# Patient Record
Sex: FEMALE | Race: WHITE | NOT HISPANIC OR LATINO | Employment: OTHER | URBAN - METROPOLITAN AREA
[De-identification: names, ages, dates, MRNs, and addresses within clinical notes are randomized per-mention and may not be internally consistent; named-entity substitution may affect disease eponyms.]

---

## 2021-07-13 ENCOUNTER — TELEPHONE (OUTPATIENT)
Dept: OBSTETRICS AND GYNECOLOGY | Facility: CLINIC | Age: 81
End: 2021-07-13

## 2021-07-13 RX ORDER — FLUCONAZOLE 150 MG/1
TABLET ORAL
Qty: 2 TABLET | Refills: 1 | Status: CANCELLED | OUTPATIENT
Start: 2021-07-13

## 2022-04-19 ENCOUNTER — OFFICE VISIT (OUTPATIENT)
Dept: OBSTETRICS AND GYNECOLOGY | Facility: CLINIC | Age: 82
End: 2022-04-19
Payer: MEDICARE

## 2022-04-19 VITALS — WEIGHT: 129 LBS | DIASTOLIC BLOOD PRESSURE: 58 MMHG | SYSTOLIC BLOOD PRESSURE: 90 MMHG

## 2022-04-19 DIAGNOSIS — N36.8 MASS OF URETHRA: Primary | ICD-10-CM

## 2022-04-19 DIAGNOSIS — R30.0 DYSURIA: ICD-10-CM

## 2022-04-19 PROCEDURE — 87088 URINE BACTERIA CULTURE: CPT

## 2022-04-19 PROCEDURE — 99204 PR OFFICE/OUTPT VISIT, NEW, LEVL IV, 45-59 MIN: ICD-10-PCS | Mod: S$GLB,,,

## 2022-04-19 PROCEDURE — 87077 CULTURE AEROBIC IDENTIFY: CPT

## 2022-04-19 PROCEDURE — 87186 SC STD MICRODIL/AGAR DIL: CPT | Mod: 59

## 2022-04-19 PROCEDURE — 87086 URINE CULTURE/COLONY COUNT: CPT

## 2022-04-19 PROCEDURE — 99204 OFFICE O/P NEW MOD 45 MIN: CPT | Mod: S$GLB,,,

## 2022-04-19 RX ORDER — ONDANSETRON 4 MG/1
4 TABLET, ORALLY DISINTEGRATING ORAL EVERY 8 HOURS PRN
COMMUNITY
Start: 2021-05-12 | End: 2022-11-30

## 2022-04-19 RX ORDER — AMLODIPINE BESYLATE 2.5 MG/1
TABLET ORAL
COMMUNITY
Start: 2021-05-05

## 2022-04-19 RX ORDER — DICLOFENAC SODIUM 10 MG/G
1 GEL TOPICAL
COMMUNITY
Start: 2021-11-11 | End: 2022-11-30

## 2022-04-19 RX ORDER — EVOLOCUMAB 140 MG/ML
INJECTION, SOLUTION SUBCUTANEOUS
COMMUNITY
Start: 2022-02-22

## 2022-04-19 RX ORDER — TRAZODONE HYDROCHLORIDE 100 MG/1
1 TABLET ORAL NIGHTLY
COMMUNITY
Start: 2021-08-24 | End: 2022-11-30

## 2022-04-19 RX ORDER — MIDODRINE HYDROCHLORIDE 2.5 MG/1
2.5 TABLET ORAL 3 TIMES DAILY
COMMUNITY
Start: 2022-01-31

## 2022-04-19 RX ORDER — LACTULOSE 10 G/15ML
SOLUTION ORAL; RECTAL
COMMUNITY
Start: 2021-12-14 | End: 2022-12-14

## 2022-04-19 RX ORDER — NITROFURANTOIN 25; 75 MG/1; MG/1
100 CAPSULE ORAL 2 TIMES DAILY
Qty: 14 CAPSULE | Refills: 0 | Status: SHIPPED | OUTPATIENT
Start: 2022-04-19 | End: 2022-04-26

## 2022-04-19 RX ORDER — CYANOCOBALAMIN 1000 UG/ML
1000 INJECTION, SOLUTION INTRAMUSCULAR; SUBCUTANEOUS
COMMUNITY
Start: 2021-11-30 | End: 2022-11-30

## 2022-04-19 RX ORDER — GABAPENTIN 300 MG/1
1 CAPSULE ORAL 3 TIMES DAILY
COMMUNITY
Start: 2022-03-29 | End: 2023-03-29

## 2022-04-19 RX ORDER — CEPHALEXIN 500 MG/1
CAPSULE ORAL
COMMUNITY
Start: 2021-08-25 | End: 2022-04-19

## 2022-04-19 RX ORDER — HYDROCORTISONE 25 MG/G
CREAM TOPICAL
COMMUNITY
Start: 2021-05-12

## 2022-04-19 RX ORDER — HYDRALAZINE HYDROCHLORIDE 10 MG/1
10 TABLET, FILM COATED ORAL
COMMUNITY
Start: 2021-11-30 | End: 2022-11-30

## 2022-04-19 RX ORDER — DICYCLOMINE HYDROCHLORIDE 20 MG/1
20 TABLET ORAL
COMMUNITY
Start: 2021-11-30 | End: 2022-11-30

## 2022-04-19 RX ORDER — SUCRALFATE 1 G/1
1 TABLET ORAL
COMMUNITY
Start: 2021-05-12 | End: 2023-04-11

## 2022-04-19 RX ORDER — EZETIMIBE 10 MG/1
10 TABLET ORAL DAILY
COMMUNITY
Start: 2021-12-14

## 2022-04-19 RX ORDER — CLOTRIMAZOLE AND BETAMETHASONE DIPROPIONATE 10; .64 MG/G; MG/G
CREAM TOPICAL
COMMUNITY
Start: 2021-07-14

## 2022-04-19 RX ORDER — TIZANIDINE 4 MG/1
1 TABLET ORAL EVERY 8 HOURS
COMMUNITY
Start: 2021-11-30 | End: 2022-11-30

## 2022-04-19 RX ORDER — HYDROCODONE BITARTRATE AND ACETAMINOPHEN 5; 325 MG/1; MG/1
1 TABLET ORAL 2 TIMES DAILY PRN
COMMUNITY
Start: 2022-03-22

## 2022-04-19 RX ORDER — PANTOPRAZOLE SODIUM 40 MG/1
1 TABLET, DELAYED RELEASE ORAL DAILY
COMMUNITY
Start: 2021-05-12 | End: 2023-01-04

## 2022-04-19 RX ORDER — ERGOCALCIFEROL 1.25 MG/1
50000 CAPSULE ORAL
COMMUNITY
Start: 2021-11-30 | End: 2022-11-30

## 2022-04-19 RX ORDER — CYCLOBENZAPRINE HCL 5 MG
5 TABLET ORAL 3 TIMES DAILY PRN
COMMUNITY
Start: 2022-04-08

## 2022-04-19 RX ORDER — ROSUVASTATIN CALCIUM 20 MG/1
1 TABLET, COATED ORAL DAILY
COMMUNITY
Start: 2021-11-30 | End: 2022-11-30

## 2022-04-19 RX ORDER — ERGOCALCIFEROL 1.25 MG/1
CAPSULE ORAL
COMMUNITY
Start: 2022-04-06

## 2022-04-19 RX ORDER — NALOXONE HYDROCHLORIDE 4 MG/.1ML
4 SPRAY NASAL DAILY PRN
COMMUNITY
Start: 2022-03-22 | End: 2023-03-22

## 2022-04-19 RX ORDER — LANOLIN ALCOHOL/MO/W.PET/CERES
1 CREAM (GRAM) TOPICAL DAILY
COMMUNITY
Start: 2021-11-30 | End: 2022-11-30

## 2022-04-19 RX ORDER — HYOSCYAMINE SULFATE 0.12 MG/1
0.12 TABLET SUBLINGUAL
COMMUNITY
Start: 2022-01-04 | End: 2023-01-04

## 2022-04-19 RX ORDER — METFORMIN HYDROCHLORIDE 500 MG/1
500 TABLET, EXTENDED RELEASE ORAL 2 TIMES DAILY
COMMUNITY
Start: 2021-12-14

## 2022-04-20 NOTE — PROGRESS NOTES
SUBJECTIVE:       Chief Complaint: Urinary Tract Infection (Patient states she has been bleeding for over a week. Patine states she has burning during urination. )    History of Present Illness: Davida Chery is a 81 y.o. female  presenting for abnormal bleeding, painful urination, and pain with sex.  She reports the spotting/bleeding and dysuria have persisted for the last week.  She states she had a complete hysterectomy at age 26 years.  She is unsure if the bleeding originates from the vagina.  She reports difficulty with penetration during sex.  She has one male partner (spouse).  She states she feels safe at home.  She uses a wheelchair for assistance in ambulation.    Review of Systems   Constitutional: Negative for activity change, appetite change, chills, fatigue, fever and unexpected weight change.   HENT: Negative for nasal congestion, dental problem, ear pain, postnasal drip, rhinorrhea, sinus pressure/congestion, sneezing and sore throat.    Eyes: Negative for discharge, visual disturbance and eye dryness.   Respiratory: Negative for cough, chest tightness and shortness of breath.    Cardiovascular: Negative for chest pain, palpitations and leg swelling.   Gastrointestinal: Negative for abdominal distention, abdominal pain, change in bowel habit, constipation, diarrhea, nausea, vomiting, reflux and change in bowel habit.   Endocrine: Negative for cold intolerance, heat intolerance, polydipsia and polyuria.   Genitourinary: Positive for difficulty urinating, dyspareunia, dysuria and vaginal bleeding (possible). Negative for frequency, genital sores, hot flashes, pelvic pain, urgency, vaginal discharge, vaginal pain and vaginal dryness.   Musculoskeletal: Negative for back pain, myalgias, neck pain and neck stiffness.   Integumentary:  Negative for rash, breast mass, breast discharge and breast tenderness.   Allergic/Immunologic: Negative for environmental allergies and immunocompromised state.    Neurological: Negative for dizziness, syncope, weakness, light-headedness, numbness and headaches.   Hematological: Negative for adenopathy. Does not bruise/bleed easily.   Psychiatric/Behavioral: Negative for confusion, decreased concentration and sleep disturbance. The patient is not nervous/anxious.    Breast: Negative for mass and tenderness        OBJECTIVE:      BP (!) 90/58   Wt 58.5 kg (129 lb)     Chaperone present.    Physical Exam:   Constitutional: She is oriented to person, place, and time. Vital signs are normal. She appears well-developed and well-nourished. She is cooperative.    HENT:   Head: Normocephalic and atraumatic.      Cardiovascular: Normal rate and regular rhythm.     Pulmonary/Chest: Effort normal.          Genitourinary:    Inguinal canal normal.      Pelvic exam was performed with patient prone.   The external female genitalia was normal.   Labial bartholins normal.There is an absent right adnexa and an absent left adnexa. Cervix is absent.Uterus is absent. Urethral Meatus exhibits: prolapsed  Urethra findings: urethral mass (possible polyp) and tenderness  Bladder findings: bladder tenderness            Musculoskeletal: Moves all extremeties.      Right lower leg: No edema.      Left lower leg: No edema.       Neurological: She is alert and oriented to person, place, and time. GCS eye subscore is 4. GCS verbal subscore is 5. GCS motor subscore is 6.    Skin: Skin is warm and dry. Capillary refill takes less than 2 seconds.    Psychiatric: She has a normal mood and affect. Her speech is normal and behavior is normal. Mood, affect and thought content normal.         ASSESSMENT:       1. Mass of urethra    2. Dysuria          PLAN:     Mass of urethra  -     Ambulatory referral/consult to Urology; Future; Expected date: 04/20/2022    Dysuria  -     nitrofurantoin, macrocrystal-monohydrate, (MACROBID) 100 MG capsule; Take 1 capsule (100 mg total) by mouth 2 (two) times daily. for 7 days   Dispense: 14 capsule; Refill: 0  -     Urine culture  -     POCT URINE DIPSTICK WITHOUT MICROSCOPE    Patient was counseled today on [employer] Pap guidelines and recommendations for yearly pelvic exams, mammograms and monthly self breast exams; to see her PCP for other health maintenance.  Reassurance provided.  All questions answered.  Patient verbalized understanding.      Follow up if symptoms worsen or fail to improve.

## 2022-04-22 ENCOUNTER — TELEPHONE (OUTPATIENT)
Dept: OBSTETRICS AND GYNECOLOGY | Facility: CLINIC | Age: 82
End: 2022-04-22
Payer: MEDICARE

## 2022-04-22 LAB
BACTERIA UR CULT: ABNORMAL
BACTERIA UR CULT: ABNORMAL

## 2022-04-22 NOTE — TELEPHONE ENCOUNTER
----- Message from Kim Bowie NP sent at 4/22/2022  3:41 PM CDT -----  Please notify patient her urine culture confirms a UTI.  She should continue taking the Macrobid as prescribed, drink plenty of water, and notify us if her urinary symptoms worsen or do not resolve after antibiotic completion.  Also, please confirm her urology appointment with Dr. Pastor has been made.

## 2022-04-22 NOTE — PROGRESS NOTES
Please notify patient her urine culture confirms a UTI.  She should continue taking the Macrobid as prescribed, drink plenty of water, and notify us if her urinary symptoms worsen or do not resolve after antibiotic completion.  Also, please confirm her urology appointment with Dr. Pastor has been made.

## 2022-04-27 ENCOUNTER — OFFICE VISIT (OUTPATIENT)
Dept: UROLOGY | Facility: CLINIC | Age: 82
End: 2022-04-27
Payer: MEDICARE

## 2022-04-27 VITALS
BODY MASS INDEX: 26 KG/M2 | DIASTOLIC BLOOD PRESSURE: 69 MMHG | HEART RATE: 95 BPM | HEIGHT: 59 IN | WEIGHT: 129 LBS | RESPIRATION RATE: 18 BRPM | SYSTOLIC BLOOD PRESSURE: 125 MMHG

## 2022-04-27 DIAGNOSIS — N36.8 MASS OF URETHRA: Primary | ICD-10-CM

## 2022-04-27 DIAGNOSIS — R31.0 GROSS HEMATURIA: ICD-10-CM

## 2022-04-27 DIAGNOSIS — Z01.818 PRE-OP TESTING: ICD-10-CM

## 2022-04-27 LAB
BILIRUB SERPL-MCNC: NORMAL MG/DL
BLOOD URINE, POC: NORMAL
CLARITY, POC UA: CLEAR
COLOR, POC UA: YELLOW
GLUCOSE UR QL STRIP: NORMAL
KETONES UR QL STRIP: NORMAL
LEUKOCYTE ESTERASE URINE, POC: NORMAL
NITRITE, POC UA: NORMAL
PH, POC UA: 6
PROTEIN, POC: NORMAL
SPECIFIC GRAVITY, POC UA: 1.01
UROBILINOGEN, POC UA: 0.2

## 2022-04-27 PROCEDURE — 99204 OFFICE O/P NEW MOD 45 MIN: CPT | Mod: S$PBB,25,, | Performed by: STUDENT IN AN ORGANIZED HEALTH CARE EDUCATION/TRAINING PROGRAM

## 2022-04-27 PROCEDURE — 51701 INSERT BLADDER CATHETER: CPT | Mod: S$PBB,,, | Performed by: STUDENT IN AN ORGANIZED HEALTH CARE EDUCATION/TRAINING PROGRAM

## 2022-04-27 PROCEDURE — 99204 PR OFFICE/OUTPT VISIT, NEW, LEVL IV, 45-59 MIN: ICD-10-PCS | Mod: S$PBB,25,, | Performed by: STUDENT IN AN ORGANIZED HEALTH CARE EDUCATION/TRAINING PROGRAM

## 2022-04-27 PROCEDURE — 88112 PR  CYTOPATH, CELL ENHANCE TECH: ICD-10-PCS | Mod: 26,,, | Performed by: PATHOLOGY

## 2022-04-27 PROCEDURE — 99999 PR PBB SHADOW E&M-EST. PATIENT-LVL IV: CPT | Mod: PBBFAC,,, | Performed by: STUDENT IN AN ORGANIZED HEALTH CARE EDUCATION/TRAINING PROGRAM

## 2022-04-27 PROCEDURE — 88112 CYTOPATH CELL ENHANCE TECH: CPT | Performed by: PATHOLOGY

## 2022-04-27 PROCEDURE — 51701 INSERT BLADDER CATHETER: CPT | Mod: PBBFAC,PN | Performed by: STUDENT IN AN ORGANIZED HEALTH CARE EDUCATION/TRAINING PROGRAM

## 2022-04-27 PROCEDURE — 81002 URINALYSIS NONAUTO W/O SCOPE: CPT | Mod: PBBFAC,PN | Performed by: STUDENT IN AN ORGANIZED HEALTH CARE EDUCATION/TRAINING PROGRAM

## 2022-04-27 PROCEDURE — 99999 PR PBB SHADOW E&M-EST. PATIENT-LVL IV: ICD-10-PCS | Mod: PBBFAC,,, | Performed by: STUDENT IN AN ORGANIZED HEALTH CARE EDUCATION/TRAINING PROGRAM

## 2022-04-27 PROCEDURE — 99214 OFFICE O/P EST MOD 30 MIN: CPT | Mod: PBBFAC,PN,25 | Performed by: STUDENT IN AN ORGANIZED HEALTH CARE EDUCATION/TRAINING PROGRAM

## 2022-04-27 PROCEDURE — 88112 CYTOPATH CELL ENHANCE TECH: CPT | Mod: 26,,, | Performed by: PATHOLOGY

## 2022-04-27 PROCEDURE — 51701 PR INSERTION OF NON-INDWELLING BLADDER CATHETERIZATION FOR RESIDUAL UR: ICD-10-PCS | Mod: S$PBB,,, | Performed by: STUDENT IN AN ORGANIZED HEALTH CARE EDUCATION/TRAINING PROGRAM

## 2022-04-27 RX ORDER — ESTRADIOL 0.1 MG/G
1 CREAM VAGINAL
Qty: 8 G | Refills: 11 | Status: SHIPPED | OUTPATIENT
Start: 2022-04-28 | End: 2023-04-28

## 2022-04-27 NOTE — H&P (VIEW-ONLY)
"[facility] Note    PCP: Delmar Zhu MD    Chief Complaint: urethral mass    SUBJECTIVE:       History of Present Illness:  Davida Chery is a 81 y.o. female who presents to clinic for urethral mass. She is New  to our clinic.     Patient presents for evaluation of urethral mass noted by her GYN.   She had presented with UTI and bleeding. Unsure if this was vaginal or urinary in origin.     Did have some blood in her urine. She is sure that was urinary and not vaginal.   Having pain with urination and a bowel movement. Started 1 month ago.     UA today: negative for blood, leuks, nitrite   PVR today: 90 cc (catheterized)    Last urine culture: E coli, Klebsiella (4/19/22)    Family  hx: no malignancy   Hx of DM, thyroid disease  Never smoked.     Past medical, family, and social history reviewed as documented in chart with pertinent positive medical, family, and social history detailed in HPI.    Review of patient's allergies indicates:   Allergen Reactions    Asa [aspirin]     Demerol [meperidine]     Nyquil liquicaps        Past Medical History:   Diagnosis Date    Anemia     Diabetes mellitus     Lipidemia     Thyroid disease      No past surgical history on file.  No family history on file.  Social History     Tobacco Use    Smoking status: Never Smoker    Smokeless tobacco: Never Used   Substance Use Topics    Alcohol use: Not Currently        Review of Systems   Genitourinary: Positive for dysuria, hematuria and vaginal bleeding. Negative for difficulty urinating, frequency, nocturia, pelvic pain and urgency.       OBJECTIVE:     Anticoagulation: no    Estimated body mass index is 26.05 kg/m² as calculated from the following:    Height as of this encounter: 4' 11" (1.499 m).    Weight as of this encounter: 58.5 kg (128 lb 15.5 oz).    Vital Signs (Most Recent)  Pulse: 95 (04/27/22 1440)  Resp: 18 (04/27/22 1440)  BP: 125/69 (04/27/22 1440)    Physical Exam  Exam " conducted with a chaperone present.   Genitourinary:     Exam position: Lithotomy position.      Labia:         Right: No rash or tenderness.         Left: No rash or tenderness.       Comments: There is a probable thrombosed caruncle protruding from the urethra. This is tender to palpation. Does not appear to be any active bleeding.       Imaging:  No pertinent recent imaging available.    ASSESSMENT     1. Mass of urethra    2. Gross hematuria    3. Pre-op testing        PLAN:     - As she does believe she had blood in her urine we will proceed with hematuria workup  - CT Urogram  - Urine cytology   - Cystoscopy, with anesthesia given pain from urethral lesion, on 5/3  - At that time will evaluate extent of lesion  - Trial of vaginal estrogen cream, if fails and pain continues may excision    Sana Pastor MD     Letter to Kim Bowie NP

## 2022-04-28 ENCOUNTER — TELEPHONE (OUTPATIENT)
Dept: UROLOGY | Facility: CLINIC | Age: 82
End: 2022-04-28
Payer: MEDICARE

## 2022-04-28 DIAGNOSIS — N36.8 MASS OF URETHRA: Primary | ICD-10-CM

## 2022-04-28 NOTE — TELEPHONE ENCOUNTER
Returned call and spoke to patient and , informed no clearance is needed for the cystoscope, they verbally understood.

## 2022-04-28 NOTE — TELEPHONE ENCOUNTER
----- Message from Katerin Trujillo sent at 4/28/2022  9:41 AM CDT -----  Type: Needs Medical Advice  Who Called:  PT   Symptoms (please be specific):  Asking what kind of clearance would be needed before procedure     Best Call Back Number: [phone]  Additional Information: Please call and advise

## 2022-04-29 ENCOUNTER — HOSPITAL ENCOUNTER (OUTPATIENT)
Dept: RADIOLOGY | Facility: HOSPITAL | Age: 82
Discharge: HOME | End: 2022-04-29
Attending: STUDENT IN AN ORGANIZED HEALTH CARE EDUCATION/TRAINING PROGRAM
Payer: MEDICARE

## 2022-04-29 DIAGNOSIS — N36.8 MASS OF URETHRA: ICD-10-CM

## 2022-04-29 LAB
FINAL PATHOLOGIC DIAGNOSIS: NORMAL
Lab: NORMAL

## 2022-04-29 PROCEDURE — 74176 CT ABD & PELVIS W/O CONTRAST: CPT | Mod: TC

## 2022-04-29 PROCEDURE — 74176 CT ABD & PELVIS W/O CONTRAST: CPT | Mod: 26,,, | Performed by: RADIOLOGY

## 2022-04-29 PROCEDURE — 74176 CT ABDOMEN PELVIS WITHOUT CONTRAST: ICD-10-PCS | Mod: 26,,, | Performed by: RADIOLOGY

## 2022-04-30 ENCOUNTER — LAB VISIT (OUTPATIENT)
Dept: PRIMARY CARE CLINIC | Facility: CLINIC | Age: 82
End: 2022-04-30
Payer: MEDICARE

## 2022-04-30 DIAGNOSIS — Z01.818 PRE-OP TESTING: ICD-10-CM

## 2022-04-30 PROCEDURE — U0003 INFECTIOUS AGENT DETECTION BY NUCLEIC ACID (DNA OR RNA); SEVERE ACUTE RESPIRATORY SYNDROME CORONAVIRUS 2 (SARS-COV-2) (CORONAVIRUS DISEASE [COVID-19]), AMPLIFIED PROBE TECHNIQUE, MAKING USE OF HIGH THROUGHPUT TECHNOLOGIES AS DESCRIBED BY CMS-2020-01-R: HCPCS | Performed by: STUDENT IN AN ORGANIZED HEALTH CARE EDUCATION/TRAINING PROGRAM

## 2022-04-30 PROCEDURE — U0005 INFEC AGEN DETEC AMPLI PROBE: HCPCS | Performed by: STUDENT IN AN ORGANIZED HEALTH CARE EDUCATION/TRAINING PROGRAM

## 2022-05-01 LAB — SARS-COV-2 RNA RESP QL NAA+PROBE: NOT DETECTED

## 2022-05-03 ENCOUNTER — HOSPITAL ENCOUNTER (OUTPATIENT)
Facility: HOSPITAL | Age: 82
Discharge: HOME | End: 2022-05-03
Attending: STUDENT IN AN ORGANIZED HEALTH CARE EDUCATION/TRAINING PROGRAM | Admitting: STUDENT IN AN ORGANIZED HEALTH CARE EDUCATION/TRAINING PROGRAM
Payer: MEDICARE

## 2022-05-03 ENCOUNTER — ANESTHESIA (OUTPATIENT)
Dept: SURGERY | Facility: HOSPITAL | Age: 82
End: 2022-05-03
Payer: MEDICARE

## 2022-05-03 ENCOUNTER — ANESTHESIA EVENT (OUTPATIENT)
Dept: SURGERY | Facility: HOSPITAL | Age: 82
End: 2022-05-03
Payer: MEDICARE

## 2022-05-03 VITALS
DIASTOLIC BLOOD PRESSURE: 82 MMHG | OXYGEN SATURATION: 97 % | SYSTOLIC BLOOD PRESSURE: 162 MMHG | BODY MASS INDEX: 26 KG/M2 | RESPIRATION RATE: 10 BRPM | HEART RATE: 68 BPM | HEIGHT: 59 IN | TEMPERATURE: 98 F | WEIGHT: 129 LBS

## 2022-05-03 DIAGNOSIS — R31.9 HEMATURIA: ICD-10-CM

## 2022-05-03 DIAGNOSIS — R31.0 GROSS HEMATURIA: Primary | ICD-10-CM

## 2022-05-03 DIAGNOSIS — Z01.818 PRE-OP EXAM: ICD-10-CM

## 2022-05-03 PROBLEM — N36.8 MASS OF URETHRA: Status: ACTIVE | Noted: 2022-05-03

## 2022-05-03 LAB
BASOPHILS # BLD AUTO: 0.03 K/UL (ref 0–0.2)
BASOPHILS NFR BLD: 0.5 % (ref 0–1.9)
DIFFERENTIAL METHOD: ABNORMAL
EOSINOPHIL # BLD AUTO: 0.1 K/UL (ref 0–0.5)
EOSINOPHIL NFR BLD: 1.4 % (ref 0–8)
ERYTHROCYTE [DISTWIDTH] IN BLOOD BY AUTOMATED COUNT: 15.1 % (ref 11.5–14.5)
HCT VFR BLD AUTO: 33.1 % (ref 37–48.5)
HGB BLD-MCNC: 10.3 G/DL (ref 12–16)
IMM GRANULOCYTES # BLD AUTO: 0.03 K/UL (ref 0–0.04)
IMM GRANULOCYTES NFR BLD AUTO: 0.5 % (ref 0–0.5)
LYMPHOCYTES # BLD AUTO: 2.7 K/UL (ref 1–4.8)
LYMPHOCYTES NFR BLD: 41.6 % (ref 18–48)
MCH RBC QN AUTO: 25.3 PG (ref 27–31)
MCHC RBC AUTO-ENTMCNC: 31.1 G/DL (ref 32–36)
MCV RBC AUTO: 81 FL (ref 82–98)
MONOCYTES # BLD AUTO: 0.5 K/UL (ref 0.3–1)
MONOCYTES NFR BLD: 7.8 % (ref 4–15)
NEUTROPHILS # BLD AUTO: 3.2 K/UL (ref 1.8–7.7)
NEUTROPHILS NFR BLD: 48.2 % (ref 38–73)
NRBC BLD-RTO: 0 /100 WBC
PLATELET # BLD AUTO: 268 K/UL (ref 150–450)
PMV BLD AUTO: 10.4 FL (ref 9.2–12.9)
POCT GLUCOSE: 190 MG/DL (ref 70–110)
RBC # BLD AUTO: 4.07 M/UL (ref 4–5.4)
WBC # BLD AUTO: 6.52 K/UL (ref 3.9–12.7)

## 2022-05-03 PROCEDURE — 36415 COLL VENOUS BLD VENIPUNCTURE: CPT | Performed by: STUDENT IN AN ORGANIZED HEALTH CARE EDUCATION/TRAINING PROGRAM

## 2022-05-03 PROCEDURE — 52005 PR CYSTOURETHROSCOPY,URETER CATHETER: ICD-10-PCS | Mod: ,,, | Performed by: STUDENT IN AN ORGANIZED HEALTH CARE EDUCATION/TRAINING PROGRAM

## 2022-05-03 PROCEDURE — 63600175 PHARM REV CODE 636 W HCPCS: Performed by: ANESTHESIOLOGY

## 2022-05-03 PROCEDURE — 63600175 PHARM REV CODE 636 W HCPCS: Performed by: NURSE ANESTHETIST, CERTIFIED REGISTERED

## 2022-05-03 PROCEDURE — D9220A PRA ANESTHESIA: Mod: CRNA,,, | Performed by: NURSE ANESTHETIST, CERTIFIED REGISTERED

## 2022-05-03 PROCEDURE — 74420 UROGRAPHY RTRGR +-KUB: CPT | Mod: 26,,, | Performed by: STUDENT IN AN ORGANIZED HEALTH CARE EDUCATION/TRAINING PROGRAM

## 2022-05-03 PROCEDURE — 93010 ELECTROCARDIOGRAM REPORT: CPT | Mod: ,,, | Performed by: INTERNAL MEDICINE

## 2022-05-03 PROCEDURE — 85025 COMPLETE CBC W/AUTO DIFF WBC: CPT | Performed by: STUDENT IN AN ORGANIZED HEALTH CARE EDUCATION/TRAINING PROGRAM

## 2022-05-03 PROCEDURE — 93005 ELECTROCARDIOGRAM TRACING: CPT | Mod: 59

## 2022-05-03 PROCEDURE — D9220A PRA ANESTHESIA: Mod: ANES,,, | Performed by: ANESTHESIOLOGY

## 2022-05-03 PROCEDURE — 36000706: Performed by: STUDENT IN AN ORGANIZED HEALTH CARE EDUCATION/TRAINING PROGRAM

## 2022-05-03 PROCEDURE — 25000003 PHARM REV CODE 250: Performed by: NURSE ANESTHETIST, CERTIFIED REGISTERED

## 2022-05-03 PROCEDURE — D9220A PRA ANESTHESIA: ICD-10-PCS | Mod: CRNA,,, | Performed by: NURSE ANESTHETIST, CERTIFIED REGISTERED

## 2022-05-03 PROCEDURE — 74420 PR  X-RAY RETROGRADE PYELOGRAM: ICD-10-PCS | Mod: 26,,, | Performed by: STUDENT IN AN ORGANIZED HEALTH CARE EDUCATION/TRAINING PROGRAM

## 2022-05-03 PROCEDURE — 93010 EKG 12-LEAD: ICD-10-PCS | Mod: ,,, | Performed by: INTERNAL MEDICINE

## 2022-05-03 PROCEDURE — 36000707: Performed by: STUDENT IN AN ORGANIZED HEALTH CARE EDUCATION/TRAINING PROGRAM

## 2022-05-03 PROCEDURE — 71000033 HC RECOVERY, INTIAL HOUR: Performed by: STUDENT IN AN ORGANIZED HEALTH CARE EDUCATION/TRAINING PROGRAM

## 2022-05-03 PROCEDURE — 25000003 PHARM REV CODE 250: Performed by: ANESTHESIOLOGY

## 2022-05-03 PROCEDURE — C1758 CATHETER, URETERAL: HCPCS | Performed by: STUDENT IN AN ORGANIZED HEALTH CARE EDUCATION/TRAINING PROGRAM

## 2022-05-03 PROCEDURE — 25000003 PHARM REV CODE 250: Performed by: STUDENT IN AN ORGANIZED HEALTH CARE EDUCATION/TRAINING PROGRAM

## 2022-05-03 PROCEDURE — 37000009 HC ANESTHESIA EA ADD 15 MINS: Performed by: STUDENT IN AN ORGANIZED HEALTH CARE EDUCATION/TRAINING PROGRAM

## 2022-05-03 PROCEDURE — 52005 CYSTO W/URTRL CATHJ: CPT | Mod: ,,, | Performed by: STUDENT IN AN ORGANIZED HEALTH CARE EDUCATION/TRAINING PROGRAM

## 2022-05-03 PROCEDURE — 71000015 HC POSTOP RECOV 1ST HR: Performed by: STUDENT IN AN ORGANIZED HEALTH CARE EDUCATION/TRAINING PROGRAM

## 2022-05-03 PROCEDURE — 37000008 HC ANESTHESIA 1ST 15 MINUTES: Performed by: STUDENT IN AN ORGANIZED HEALTH CARE EDUCATION/TRAINING PROGRAM

## 2022-05-03 PROCEDURE — D9220A PRA ANESTHESIA: ICD-10-PCS | Mod: ANES,,, | Performed by: ANESTHESIOLOGY

## 2022-05-03 RX ORDER — SODIUM CHLORIDE, SODIUM LACTATE, POTASSIUM CHLORIDE, CALCIUM CHLORIDE 600; 310; 30; 20 MG/100ML; MG/100ML; MG/100ML; MG/100ML
INJECTION, SOLUTION INTRAVENOUS CONTINUOUS
Status: DISCONTINUED | OUTPATIENT
Start: 2022-05-03 | End: 2022-05-03 | Stop reason: HOSPADM

## 2022-05-03 RX ORDER — CEFAZOLIN SODIUM 1 G/3ML
INJECTION, POWDER, FOR SOLUTION INTRAMUSCULAR; INTRAVENOUS
Status: DISCONTINUED | OUTPATIENT
Start: 2022-05-03 | End: 2022-05-03

## 2022-05-03 RX ORDER — PHENAZOPYRIDINE HYDROCHLORIDE 100 MG/1
100 TABLET, FILM COATED ORAL 3 TIMES DAILY PRN
Qty: 30 TABLET | Refills: 0 | Status: SHIPPED | OUTPATIENT
Start: 2022-05-03 | End: 2022-05-13

## 2022-05-03 RX ORDER — LIDOCAINE HYDROCHLORIDE 10 MG/ML
1 INJECTION, SOLUTION EPIDURAL; INFILTRATION; INTRACAUDAL; PERINEURAL ONCE
Status: DISCONTINUED | OUTPATIENT
Start: 2022-05-03 | End: 2022-05-03 | Stop reason: HOSPADM

## 2022-05-03 RX ORDER — LIDOCAINE HYDROCHLORIDE 20 MG/ML
JELLY TOPICAL
Status: DISCONTINUED | OUTPATIENT
Start: 2022-05-03 | End: 2022-05-03 | Stop reason: HOSPADM

## 2022-05-03 RX ORDER — MORPHINE SULFATE 4 MG/ML
2 INJECTION, SOLUTION INTRAMUSCULAR; INTRAVENOUS EVERY 5 MIN PRN
Status: DISCONTINUED | OUTPATIENT
Start: 2022-05-03 | End: 2022-05-03 | Stop reason: HOSPADM

## 2022-05-03 RX ORDER — PROPOFOL 10 MG/ML
VIAL (ML) INTRAVENOUS
Status: DISCONTINUED | OUTPATIENT
Start: 2022-05-03 | End: 2022-05-03

## 2022-05-03 RX ORDER — CEFAZOLIN SODIUM 2 G/50ML
2 SOLUTION INTRAVENOUS
Status: DISCONTINUED | OUTPATIENT
Start: 2022-05-03 | End: 2022-05-03 | Stop reason: HOSPADM

## 2022-05-03 RX ORDER — FAMOTIDINE 10 MG/ML
20 INJECTION INTRAVENOUS ONCE
Status: COMPLETED | OUTPATIENT
Start: 2022-05-03 | End: 2022-05-03

## 2022-05-03 RX ORDER — SODIUM CHLORIDE, SODIUM LACTATE, POTASSIUM CHLORIDE, CALCIUM CHLORIDE 600; 310; 30; 20 MG/100ML; MG/100ML; MG/100ML; MG/100ML
125 INJECTION, SOLUTION INTRAVENOUS CONTINUOUS
Status: DISCONTINUED | OUTPATIENT
Start: 2022-05-03 | End: 2022-05-03 | Stop reason: HOSPADM

## 2022-05-03 RX ORDER — FAMOTIDINE 10 MG/ML
INJECTION INTRAVENOUS
Status: COMPLETED
Start: 2022-05-03 | End: 2022-05-03

## 2022-05-03 RX ORDER — ONDANSETRON 2 MG/ML
INJECTION INTRAMUSCULAR; INTRAVENOUS
Status: DISCONTINUED | OUTPATIENT
Start: 2022-05-03 | End: 2022-05-03

## 2022-05-03 RX ORDER — HYDROCODONE BITARTRATE AND ACETAMINOPHEN 5; 325 MG/1; MG/1
1 TABLET ORAL EVERY 4 HOURS PRN
Status: DISCONTINUED | OUTPATIENT
Start: 2022-05-03 | End: 2022-05-03 | Stop reason: HOSPADM

## 2022-05-03 RX ORDER — ONDANSETRON 2 MG/ML
4 INJECTION INTRAMUSCULAR; INTRAVENOUS DAILY PRN
Status: DISCONTINUED | OUTPATIENT
Start: 2022-05-03 | End: 2022-05-03 | Stop reason: HOSPADM

## 2022-05-03 RX ORDER — ONDANSETRON 4 MG/1
8 TABLET, ORALLY DISINTEGRATING ORAL EVERY 8 HOURS PRN
Status: DISCONTINUED | OUTPATIENT
Start: 2022-05-03 | End: 2022-05-03 | Stop reason: HOSPADM

## 2022-05-03 RX ORDER — MIDAZOLAM HYDROCHLORIDE 1 MG/ML
INJECTION INTRAMUSCULAR; INTRAVENOUS
Status: DISCONTINUED | OUTPATIENT
Start: 2022-05-03 | End: 2022-05-03

## 2022-05-03 RX ORDER — LIDOCAINE HYDROCHLORIDE 20 MG/ML
INJECTION, SOLUTION EPIDURAL; INFILTRATION; INTRACAUDAL; PERINEURAL
Status: DISCONTINUED | OUTPATIENT
Start: 2022-05-03 | End: 2022-05-03

## 2022-05-03 RX ADMIN — PROPOFOL 200 MG: 10 INJECTION, EMULSION INTRAVENOUS at 12:05

## 2022-05-03 RX ADMIN — CEFAZOLIN 2 G: 330 INJECTION, POWDER, FOR SOLUTION INTRAMUSCULAR; INTRAVENOUS at 12:05

## 2022-05-03 RX ADMIN — ONDANSETRON 4 MG: 2 INJECTION INTRAMUSCULAR; INTRAVENOUS at 12:05

## 2022-05-03 RX ADMIN — LIDOCAINE HYDROCHLORIDE 100 MG: 20 INJECTION, SOLUTION EPIDURAL; INFILTRATION; INTRACAUDAL; PERINEURAL at 12:05

## 2022-05-03 RX ADMIN — MIDAZOLAM HYDROCHLORIDE 2 MG: 1 INJECTION, SOLUTION INTRAMUSCULAR; INTRAVENOUS at 12:05

## 2022-05-03 RX ADMIN — FAMOTIDINE 20 MG: 10 INJECTION INTRAVENOUS at 12:05

## 2022-05-03 RX ADMIN — SODIUM CHLORIDE, POTASSIUM CHLORIDE, SODIUM LACTATE AND CALCIUM CHLORIDE: 600; 310; 30; 20 INJECTION, SOLUTION INTRAVENOUS at 12:05

## 2022-05-03 NOTE — ANESTHESIA POSTPROCEDURE EVALUATION
Anesthesia Post Evaluation    Patient: Davida Chery    Procedure(s) Performed: Procedure(s) (LRB):  CYSTOSCOPY, WITH RETROGRADE PYELOGRAM (N/A)    Final Anesthesia Type: general      Patient location during evaluation: PACU  Patient participation: Yes- Able to Participate  Level of consciousness: awake and alert  Post-procedure vital signs: reviewed and stable  Pain management: adequate  Airway patency: patent    PONV status at discharge: No PONV  Anesthetic complications: no      Cardiovascular status: blood pressure returned to baseline  Respiratory status: unassisted  Hydration status: euvolemic  Follow-up not needed.          Vitals Value Taken Time   /82 05/03/22 1330   Temp 35.6 05/03/22 1735   Pulse 68 05/03/22 1330   Resp 10 05/03/22 1330   SpO2 97 % 05/03/22 1330         Event Time   Out of Recovery 13:00:00         Pain/Leonard Score: Pain Rating Prior to Med Admin: 0 (5/3/2022  1:23 PM)  Leonard Score: 10 (5/3/2022  1:40 PM)  Modified Leonard Score: 20 (5/3/2022  1:40 PM)

## 2022-05-03 NOTE — ANESTHESIA PREPROCEDURE EVALUATION
05/03/2022  Davida Chery is a 81 y.o., female.      Pre-op Assessment    I have reviewed the Patient Summary Reports.     I have reviewed the Nursing Notes. I have reviewed the NPO Status.   I have reviewed the Medications.     Review of Systems  Social:  Former Smoker    Hematology/Oncology:  Hematology Normal        Cardiovascular:   Hypertension hyperlipidemia    Pulmonary:  Pulmonary Normal    Renal/:  Renal/ Normal     Hepatic/GI:  Hepatic/GI Normal    Musculoskeletal:   Arthritis     Neurological:   Chronic Pain Syndrome   Endocrine:   Diabetes        Physical Exam    Airway:  Mouth Opening: Normal  TM Distance: Normal  Tongue: Normal    Chest/Lungs:  Clear to auscultation    Heart:  Rate: Normal  Rhythm: Regular Rhythm        Anesthesia Plan  Type of Anesthesia, risks & benefits discussed:    Anesthesia Type: Gen Supraglottic Airway  Intra-op Monitoring Plan: Standard ASA Monitors  Post Op Pain Control Plan: multimodal analgesia  Induction:  IV  ASA Score: 3  Day of Surgery Review of History & Physical: H&P Update referred to the surgeon/provider.    Ready For Surgery From Anesthesia Perspective.     .

## 2022-05-03 NOTE — OP NOTE
[facility]  Operative Note    Date: 05/03/2022    Pre-Op Diagnosis:   - Gross hematuria   - Urethral caruncle     Post-Op Diagnosis: same    Procedure(s) Performed:   1.  Cystoscopy with bilateral retrograde pyelograms  2.  Fluoroscopy < 1 hour    Specimen(s): none    Staff Surgeon: Sana Pastor MD    Anesthesia: General LMA anesthesia    Indications: Davida Chery is a 81 y.o. female with hx of gross hematuria. Non-con CT was unremarkable. Urine cytology negative.     Findings:   - large urethral caruncle present, already softer than when previously seen in clinic after initiation of estrogen cream  - bladder with no tumors or concerning lesions, no erythema or trabeculations  - bilateral RGP showed no hydro or filling defects     Estimated Blood Loss: min    Drains: none    Procedure in Detail:  After risks, benefits and possible complications of cystoscopy were explained, the patient elected to undergo the procedure and informed consent was obtained. All questions were answered in the ben-operative area. The patient was transferred to the cystoscopy suite and placed in the supine position.  SCDs were applied and working.  Anesthesia was administered.  Once the patient was adequately sedated, she was placed in the dorsal lithotomy position and prepped and draped in the usual sterile fashion.  Time out was performed, ben-procedural antibiotics were confirmed.     A rigid cystoscope in a 22 Fr sheath was introduced into the bladder per urethra. This passed easily.  The entire urethra was visualized which showed no masses or strictures. There was a urethral caruncle present at the urethral meatus. No other urethral abnormalities. The right and left ureteral orifices were identified in the normal anatomic position and were seen effluxing clear urine.  Formal cystoscopy was performed which revealed no masses or lesions suspicious for malignancy, no trabeculations, no bladder stones and no  bladder diverticuli.      The right UO was identified and cannulated with a cone tip catheter.  A RGP was performed which showed no filling defects or hydro.  This was then repeated on the left side, revealing no filling defects or hydro.     The bladder was drained, and the patient was removed from lithotomy.     The patient tolerated the procedure well and was transferred to recovery in stable condition.    Disposition:  The patient will follow up with NP in 4 weeks.  Instructed patient's  to continue to use vaginal estrogen cream as the caruncle already appears improved and not as firm as it was in clinic. For this reason I recommended we hold off on excision. If she is still having significant bother from this we can proceed with excision at that time.     Sana Pastor MD

## 2022-05-03 NOTE — DISCHARGE INSTRUCTIONS

## 2022-05-03 NOTE — ANESTHESIA PROCEDURE NOTES
Intubation    Date/Time: 5/3/2022 12:12 PM  Performed by: Cira Duncan CRNA  Authorized by: Timi Live MD     Intubation:     Induction:  Intravenous    Intubated:  Postinduction    Mask Ventilation:  Easy mask    Attempts:  1    Attempted By:  CRNA    Difficult Airway Encountered?: No      Complications:  None    Airway Device:  Supraglottic airway/LMA    Airway Device Size:  3.5    Style/Cuff Inflation:  Cuffed (inflated to minimal occlusive pressure)    Secured at:  The lips    Placement Verified By:  Capnometry    Complicating Factors:  None    Findings Post-Intubation:  BS equal bilateral and atraumatic/condition of teeth unchanged

## 2022-05-03 NOTE — TRANSFER OF CARE
"Anesthesia Transfer of Care Note    Patient: Davida Chery    Procedure(s) Performed: Procedure(s) (LRB):  CYSTOSCOPY, WITH RETROGRADE PYELOGRAM (N/A)    Patient location: PACU    Anesthesia Type: general    Transport from OR: Transported from OR on room air with adequate spontaneous ventilation    Post pain: adequate analgesia    Post assessment: no apparent anesthetic complications and tolerated procedure well    Post vital signs: stable    Level of consciousness: awake, alert and oriented    Nausea/Vomiting: no nausea/vomiting    Complications: none    Transfer of care protocol was followed      Last vitals:   Visit Vitals  BP (!) 150/70   Pulse 67   Temp 36.7 °C (98 °F) (Oral)   Resp 17   Ht 4' 11" (1.499 m)   Wt 58.5 kg (129 lb)   SpO2 98%   Breastfeeding No   BMI 26.05 kg/m²     "

## 2022-05-03 NOTE — INTERVAL H&P NOTE
The patient has been examined and the H&P has been reviewed:    I concur with the findings and no changes have occurred since H&P was written.    Surgery risks, benefits and alternative options discussed and understood by patient/family.    Will attempt excision of urethral caruncle if looks amenable.   CT non-con showed no concerning  findings.     There are no hospital problems to display for this patient.

## 2022-05-03 NOTE — DISCHARGE SUMMARY
[facility]  Discharge Note  Short Stay    Admit Date: 5/3/2022    Discharge Date and Time: 05/03/2022 12:40 PM      Attending Physician: Sana Pastor MD     Discharge Provider: Sana Pastor    Diagnoses:  Active Hospital Problems    Diagnosis  POA    *Hematuria [R31.9]  Unknown    Mass of urethra [N36.8]  Unknown      Resolved Hospital Problems   No resolved problems to display.       Discharged Condition: good    Hospital Course: Patient was admitted for outpatient procedure and tolerated the procedure well with no complications. The patient was discharged home in good condition on the same day.       Final Diagnoses: Same as principal problem.    Disposition: Home or Self Care    Follow up/Patient Instructions:    Medications:  Reconciled Home Medications:   Current Discharge Medication List      START taking these medications    Details   phenazopyridine (PYRIDIUM) 100 MG tablet Take 1 tablet (100 mg total) by mouth 3 (three) times daily as needed for Pain.  Qty: 30 tablet, Refills: 0         CONTINUE these medications which have NOT CHANGED    Details   amLODIPine (NORVASC) 2.5 MG tablet       clotrimazole-betamethasone 1-0.05% (LOTRISONE) cream       cyanocobalamin 1,000 mcg/mL injection 1,000 mcg/mL.      cyclobenzaprine (FLEXERIL) 5 MG tablet Take 5 mg by mouth 3 (three) times daily as needed.      diclofenac sodium (VOLTAREN) 1 % Gel Apply 1 % topically.      dicyclomine (BENTYL) 20 mg tablet Take 20 mg by mouth.      !! ergocalciferol (ERGOCALCIFEROL) 50,000 unit Cap Take 50,000 Units by mouth.      !! ergocalciferol (ERGOCALCIFEROL) 50,000 unit Cap Take by mouth.      estradioL (ESTRACE) 0.01 % (0.1 mg/gram) vaginal cream Place 1 g vaginally twice a week. Apply daily for 2 weeks, and then twice a week.  Qty: 8 g, Refills: 11      ezetimibe (ZETIA) 10 mg tablet Take 10 mg by mouth once daily.      gabapentin (NEURONTIN) 300 MG capsule Take 1 capsule by mouth 3 (three) times daily.       hydrALAZINE (APRESOLINE) 10 MG tablet Take 10 mg by mouth.      HYDROcodone-acetaminophen (NORCO) 5-325 mg per tablet Take 1 tablet by mouth 2 (two) times daily as needed.      hydrocortisone (ANUSOL-HC) 2.5 % rectal cream       hyoscyamine (LEVSIN/SL) 0.125 mg Subl Place 0.125 mg under the tongue.      lactulose (CHRONULAC) 10 gram/15 mL solution Take by mouth.      magnesium oxide (MAG-OX) 400 mg (241.3 mg magnesium) tablet Take 1 tablet by mouth once daily.      metFORMIN (GLUCOPHAGE-XR) 500 MG ER 24hr tablet Take 500 mg by mouth 2 (two) times daily.      midodrine (PROAMATINE) 2.5 MG Tab Take 2.5 mg by mouth 3 (three) times daily.      ondansetron (ZOFRAN-ODT) 4 MG TbDL Take 4 mg by mouth every 8 (eight) hours as needed.      pantoprazole (PROTONIX) 40 MG tablet Take 1 tablet by mouth once daily.      REPATHA SURECLICK 140 mg/mL PnIj Inject into the skin.      rosuvastatin (CRESTOR) 20 MG tablet Take 1 tablet by mouth once daily.      sucralfate (CARAFATE) 1 gram tablet Take 1 tablet by mouth 4 (four) times daily before meals and nightly.      tiZANidine (ZANAFLEX) 4 MG tablet Take 1 tablet by mouth every 8 (eight) hours.      traZODone (DESYREL) 100 MG tablet Take 1 tablet by mouth nightly.      naloxone (NARCAN) 4 mg/actuation Spry 4 mg by Nasal route daily as needed.       !! - Potential duplicate medications found. Please discuss with provider.        Discharge Procedure Orders   Diet general     No dressing needed     Activity as tolerated      Follow-up Information     Brynn Portillo NP Follow up in 4 week(s).    Specialty: Urology  Why: assess symptoms  Contact information:  [address] [place] [address] [address]  [place] [id number] [phone]                         Sana Pastor MD  Urology Department

## 2022-05-10 ENCOUNTER — TELEPHONE (OUTPATIENT)
Dept: UROLOGY | Facility: CLINIC | Age: 82
End: 2022-05-10
Payer: MEDICARE

## 2022-05-10 NOTE — TELEPHONE ENCOUNTER
----- Message from Aleksandra Quintero LPN sent at 5/10/2022  1:31 PM CDT -----  Regarding: FW: advice  Contact: self    ----- Message -----  From: Kailee Rizo  Sent: 5/10/2022   9:24 AM CDT  To: Bandar Swain Staff  Subject: advice                                           Type: Needs Medical Advice  Who Called:  self  Symptoms (please be specific):    How long has patient had these symptoms:    Pharmacy name and phone #:    Best Call Back Number: [phone] or [phone]  Additional Information: Patient have questions for the nurse.

## 2022-05-10 NOTE — TELEPHONE ENCOUNTER
Returned call and spoke with patient, she wanted to know how long her urine will have the orange color to it. Informed that is normal with taking the med pyridium. As long as she is taking it, her urine will be orange. Patient verbally understood.

## 2023-03-28 ENCOUNTER — OFFICE VISIT (OUTPATIENT)
Dept: OBSTETRICS AND GYNECOLOGY | Facility: CLINIC | Age: 83
End: 2023-03-28
Payer: MEDICARE

## 2023-03-28 VITALS
BODY MASS INDEX: 24.19 KG/M2 | HEIGHT: 59 IN | DIASTOLIC BLOOD PRESSURE: 51 MMHG | SYSTOLIC BLOOD PRESSURE: 103 MMHG | WEIGHT: 120 LBS

## 2023-03-28 DIAGNOSIS — N81.10 BLADDER PROLAPSE, FEMALE, ACQUIRED: ICD-10-CM

## 2023-03-28 DIAGNOSIS — N81.6 RECTOCELE: ICD-10-CM

## 2023-03-28 DIAGNOSIS — N81.0 URETHROCELE: ICD-10-CM

## 2023-03-28 PROBLEM — I77.9 BILATERAL CAROTID ARTERY DISEASE: Status: ACTIVE | Noted: 2023-03-28

## 2023-03-28 PROBLEM — T40.2X5A CONSTIPATION DUE TO OPIOID THERAPY: Status: ACTIVE | Noted: 2023-03-28

## 2023-03-28 PROBLEM — R10.9 CHRONIC ABDOMINAL PAIN: Status: ACTIVE | Noted: 2023-03-28

## 2023-03-28 PROBLEM — I70.8 AORTO-ILIAC ATHEROSCLEROSIS: Status: ACTIVE | Noted: 2023-03-28

## 2023-03-28 PROBLEM — K59.03 CONSTIPATION DUE TO OPIOID THERAPY: Status: ACTIVE | Noted: 2023-03-28

## 2023-03-28 PROBLEM — K21.9 GERD (GASTROESOPHAGEAL REFLUX DISEASE): Status: ACTIVE | Noted: 2023-03-28

## 2023-03-28 PROBLEM — Z98.84 HISTORY OF ROUX-EN-Y GASTRIC BYPASS: Status: ACTIVE | Noted: 2023-03-28

## 2023-03-28 PROBLEM — E11.8 DM (DIABETES MELLITUS) WITH COMPLICATIONS: Status: ACTIVE | Noted: 2023-03-28

## 2023-03-28 PROBLEM — M54.50 LOW BACK PAIN: Status: ACTIVE | Noted: 2023-03-28

## 2023-03-28 PROBLEM — Z98.84 H/O GASTRIC BYPASS: Status: ACTIVE | Noted: 2023-03-28

## 2023-03-28 PROBLEM — G89.29 CHRONIC ABDOMINAL PAIN: Status: ACTIVE | Noted: 2023-03-28

## 2023-03-28 PROBLEM — M25.512 LEFT SHOULDER PAIN: Status: ACTIVE | Noted: 2023-03-28

## 2023-03-28 PROBLEM — E78.2 COMBINED HYPERLIPIDEMIA: Status: ACTIVE | Noted: 2023-03-28

## 2023-03-28 PROBLEM — R52 PAIN MANAGEMENT: Status: ACTIVE | Noted: 2023-03-28

## 2023-03-28 PROBLEM — R10.84 GENERALIZED ABDOMINAL PAIN: Status: ACTIVE | Noted: 2023-03-28

## 2023-03-28 PROBLEM — E11.40 DIABETIC NEUROPATHY WITH NEUROLOGIC COMPLICATION: Status: ACTIVE | Noted: 2023-03-28

## 2023-03-28 PROBLEM — M54.12 CERVICAL RADICULOPATHY: Status: ACTIVE | Noted: 2023-03-28

## 2023-03-28 PROBLEM — E83.42 HYPOMAGNESEMIA: Status: ACTIVE | Noted: 2023-03-28

## 2023-03-28 PROBLEM — D51.0 PERNICIOUS ANEMIA: Status: ACTIVE | Noted: 2023-03-28

## 2023-03-28 PROBLEM — R29.6 RECURRENT FALLS: Status: ACTIVE | Noted: 2023-03-28

## 2023-03-28 PROBLEM — I70.0 AORTO-ILIAC ATHEROSCLEROSIS: Status: ACTIVE | Noted: 2023-03-28

## 2023-03-28 PROBLEM — I95.9 HYPOTENSION: Status: ACTIVE | Noted: 2023-03-28

## 2023-03-28 PROBLEM — E55.9 VITAMIN D DEFICIENCY DISEASE: Status: ACTIVE | Noted: 2023-03-28

## 2023-03-28 PROBLEM — M81.0 OSTEOPOROSIS, POSTMENOPAUSAL: Status: ACTIVE | Noted: 2023-03-28

## 2023-03-28 PROBLEM — G62.9 PERIPHERAL NEUROPATHY: Status: ACTIVE | Noted: 2023-03-28

## 2023-03-28 PROBLEM — G89.4 CHRONIC PAIN SYNDROME: Status: ACTIVE | Noted: 2023-03-28

## 2023-03-28 PROBLEM — E11.49 DIABETIC NEUROPATHY WITH NEUROLOGIC COMPLICATION: Status: ACTIVE | Noted: 2023-03-28

## 2023-03-28 PROBLEM — Z91.81 RISK FOR FALLS: Status: ACTIVE | Noted: 2023-03-28

## 2023-03-28 PROBLEM — K29.70 GASTRITIS: Status: ACTIVE | Noted: 2023-03-28

## 2023-03-28 PROBLEM — M79.7 FIBROMYALGIA: Status: ACTIVE | Noted: 2023-03-28

## 2023-03-28 PROBLEM — M54.17 LUMBOSACRAL RADICULOPATHY: Status: ACTIVE | Noted: 2023-03-28

## 2023-03-28 PROCEDURE — 99214 OFFICE O/P EST MOD 30 MIN: CPT | Mod: S$GLB,,, | Performed by: OBSTETRICS & GYNECOLOGY

## 2023-03-28 PROCEDURE — 99214 PR OFFICE/OUTPT VISIT, EST, LEVL IV, 30-39 MIN: ICD-10-PCS | Mod: S$GLB,,, | Performed by: OBSTETRICS & GYNECOLOGY

## 2023-03-28 RX ORDER — ONDANSETRON 4 MG/1
4 TABLET, ORALLY DISINTEGRATING ORAL
COMMUNITY
Start: 2022-09-08 | End: 2023-09-08

## 2023-03-28 RX ORDER — FAMOTIDINE 20 MG/1
20 TABLET, FILM COATED ORAL
COMMUNITY
Start: 2023-03-15

## 2023-03-28 RX ORDER — TRAMADOL HYDROCHLORIDE 100 MG/1
100 TABLET, COATED ORAL
COMMUNITY
Start: 2023-01-10

## 2023-03-28 RX ORDER — METHENAMINE HIPPURATE 1000 MG/1
1 TABLET ORAL
COMMUNITY
Start: 2023-03-21 | End: 2023-05-20

## 2023-03-28 RX ORDER — DULAGLUTIDE 0.75 MG/.5ML
0.75 INJECTION, SOLUTION SUBCUTANEOUS WEEKLY
COMMUNITY
Start: 2022-10-31

## 2023-03-28 RX ORDER — CYANOCOBALAMIN 1000 UG/ML
INJECTION, SOLUTION INTRAMUSCULAR; SUBCUTANEOUS
COMMUNITY
Start: 2022-12-07 | End: 2023-12-07

## 2023-03-28 RX ORDER — BACITRACIN 500 [USP'U]/G
OINTMENT TOPICAL 2 TIMES DAILY
COMMUNITY

## 2023-03-28 RX ORDER — PETROLATUM,WHITE/LANOLIN
OINTMENT (GRAM) TOPICAL
COMMUNITY

## 2023-03-28 RX ORDER — TIZANIDINE 4 MG/1
TABLET ORAL
COMMUNITY
Start: 2022-09-08 | End: 2023-12-07

## 2023-03-28 RX ORDER — LANOLIN ALCOHOL/MO/W.PET/CERES
400 CREAM (GRAM) TOPICAL
COMMUNITY
Start: 2022-12-07

## 2023-03-28 RX ORDER — DICYCLOMINE HYDROCHLORIDE 20 MG/1
TABLET ORAL
COMMUNITY
Start: 2022-09-08 | End: 2023-12-07

## 2023-03-28 RX ORDER — NITROGLYCERIN 0.4 MG/1
0.4 TABLET SUBLINGUAL EVERY 5 MIN PRN
COMMUNITY

## 2023-03-28 NOTE — PROGRESS NOTES
Davida Chery is a 82 y.o.  who presents today for GYN problem visit.     C/C:   Chief Complaint   Patient presents with    Vaginal Prolapse       HPI: Has GYN related concerns including symptoms of prolapse of her rectum and on exam today she has a urethrocele with minimal cystocele and second-degree rectocele.  She was referred by nurse practitioner Gunjan Bowie for further evaluation and possible placement of pessary..     MENSTRUAL HISTORY  No LMP recorded. Patient has had a hysterectomy..      PAP HISTORY: last pap hysterectomy,  denies any history of abnormal pap smear        Review of patient's allergies indicates:   Allergen Reactions    Aspirin      Other reaction(s): Ulcers present    Penicillin      Other reaction(s): Edema, Rash    Meperidine      Other reaction(s): Rash, Swelling    Codeine      [id number]      Diphenhydramine      Other reaction(s): Swelling  swelling of face and mouth      Doxylamin-pse-dm-acetaminophen      Other reaction(s): Sweats    Nyquil liquicaps      Past Medical History:   Diagnosis Date    Anemia     Congestive heart failure     Diabetes mellitus     Heart disease     Lipidemia     Thyroid disease      Past Surgical History:   Procedure Laterality Date    ANKLE SURGERY Right     ANKLE SURGERY Left     BACK SURGERY      x3    COLON SURGERY      twisted    CORONARY STENT PLACEMENT      CYSTOSCOPY W/ RETROGRADES N/A 2022    Procedure: CYSTOSCOPY, WITH RETROGRADE PYELOGRAM;  Surgeon: Sana Pastor MD;  Location: [facility];  Service: Urology;  Laterality: N/A;    GASTRIC BYPASS      HYSTERECTOMY      LASIK Bilateral     NECK SURGERY      x2    OOPHORECTOMY Bilateral     TUBAL LIGATION      WRIST SURGERY Left     WRIST SURGERY Right      Past Surgical History:   Procedure Laterality Date    ANKLE SURGERY Right     ANKLE SURGERY Left     BACK SURGERY      x3    COLON SURGERY      twisted    CORONARY STENT PLACEMENT      CYSTOSCOPY W/ RETROGRADES N/A 2022     "Procedure: CYSTOSCOPY, WITH RETROGRADE PYELOGRAM;  Surgeon: Sana Pastor MD;  Location: [facility] OR;  Service: Urology;  Laterality: N/A;    GASTRIC BYPASS      HYSTERECTOMY      LASIK Bilateral     NECK SURGERY      x2    OOPHORECTOMY Bilateral     TUBAL LIGATION      WRIST SURGERY Left     WRIST SURGERY Right      OB History    Para Term  AB Living   3 2 2   1 2   SAB IAB Ectopic Multiple Live Births   1       2      # Outcome Date GA Lbr Jorge/2nd Weight Sex Delivery Anes PTL Lv   3 SAB            2 Term            1 Term              OB History          3    Para   2    Term   2            AB   1    Living   2         SAB   1    IAB        Ectopic        Multiple        Live Births   2               Family History   Problem Relation Age of Onset    Kidney disease Father     Heart disease Mother      Social History     Substance and Sexual Activity   Sexual Activity Not Currently    Partners: Male    Birth control/protection: None       Delmar Zhu MD          ROS:  GENERAL: Denies weight gain or weight loss. Feeling well overall.   SKIN: Denies rash or lesions.   HEAD: Denies head injury or headache.   NODES: Denies enlarged lymph nodes.   CHEST: Denies chest pain or shortness of breath.    ABDOMEN: No abdominal pain, constipation, diarrhea, nausea, vomiting or rectal bleeding.   URINARY: No frequency, dysuria, hematuria, or burning on urination.  REPRODUCTIVE: See HPI.   BREASTS: denies pain, lumps, or nipple discharge.   HEMATOLOGIC: No easy bruisability or excessive bleeding.   MUSCULOSKELETAL: Denies joint pain or swelling.   NEUROLOGIC: Denies syncope or weakness.   PSYCHIATRIC: Denies depression, anxiety or mood swings.      OBJECTIVE:  BP (!) 103/51 (BP Location: Left arm, Patient Position: Sitting)   Ht 4' 11" (1.499 m)   Wt 54.4 kg (120 lb)   BMI 24.24 kg/m²   PHYSICAL EXAM:  APPEARANCE: Well nourished, well developed, in no acute distress.  AFFECT: WNL, alert and " oriented x 3  SKIN: No acne or hirsutism  CHEST: Good respiratory effect  ABDOMEN: Soft.  No tenderness or masses.  No hepatosplenomegaly.  No hernias.  BREASTS:  Deferred  PELVIC:   External vaginal exam shows protruding urethrocele that appears to have been rubbing on her clothes and has a raw surface.  Otherwise she has no significant cystocele and only a moderate rectocele.  But her main complaints is pressure in the rectal area especially when having bowel movements so her main complaint is the rectocele.  EXTREMITIES: No edema.      A:  Urethrocele, rectocele.  82 y.o. female  for GYN problem visit  Body mass index is 24.24 kg/m².  Patient Active Problem List   Diagnosis    Hematuria    Mass of urethra    Aorto-iliac atherosclerosis    Bilateral carotid artery disease    Cervical radiculopathy    Lumbosacral radiculopathy    Combined hyperlipidemia    Constipation due to opioid therapy    Diabetic neuropathy with neurologic complication    DM (diabetes mellitus) with complications    Gastritis    GERD (gastroesophageal reflux disease)    H/O gastric bypass    History of Lito-en-Y gastric bypass    Hypomagnesemia    Hypotension    Left shoulder pain    Low back pain    Chronic abdominal pain    Chronic pain syndrome    Fibromyalgia    Generalized abdominal pain    Osteoporosis, postmenopausal    Pain management    Peripheral neuropathy    Pernicious anemia    Recurrent falls    Risk for falls    Vitamin D deficiency disease    Yes    Urethrocele    Rectocele         P:  After discussing the options the patient agrees to try a small pessary size 1 or 2 and we will need to order that and have the patient return in 2 weeks for placement of the pessary.  Yes    Urethrocele    Rectocele      ----Continue annual exams, return then or sooner prn      No follow-ups on file.

## 2023-04-17 ENCOUNTER — OFFICE VISIT (OUTPATIENT)
Dept: OBSTETRICS AND GYNECOLOGY | Facility: CLINIC | Age: 83
End: 2023-04-17
Payer: MEDICARE

## 2023-04-17 VITALS — HEIGHT: 59 IN | BODY MASS INDEX: 24.24 KG/M2 | DIASTOLIC BLOOD PRESSURE: 62 MMHG | SYSTOLIC BLOOD PRESSURE: 108 MMHG

## 2023-04-17 DIAGNOSIS — N81.0 URETHROCELE: ICD-10-CM

## 2023-04-17 DIAGNOSIS — N36.8 MASS OF URETHRA: Primary | ICD-10-CM

## 2023-04-17 DIAGNOSIS — N81.6 RECTOCELE: ICD-10-CM

## 2023-04-17 PROBLEM — N81.4 CYSTOCELE WITH PROLAPSE: Status: ACTIVE | Noted: 2023-03-28

## 2023-04-17 PROBLEM — K21.00 GASTRO-ESOPHAGEAL REFLUX DISEASE WITH ESOPHAGITIS: Status: ACTIVE | Noted: 2023-04-17

## 2023-04-17 PROCEDURE — A4561 PESSARY RUBBER, ANY TYPE: HCPCS | Mod: S$GLB,,, | Performed by: OBSTETRICS & GYNECOLOGY

## 2023-04-17 PROCEDURE — 99499 UNLISTED E&M SERVICE: CPT | Mod: S$GLB,,, | Performed by: OBSTETRICS & GYNECOLOGY

## 2023-04-17 PROCEDURE — A4561 PR PESSARY RUBBER, ANY TYPE: ICD-10-PCS | Mod: S$GLB,,, | Performed by: OBSTETRICS & GYNECOLOGY

## 2023-04-17 PROCEDURE — 57160 INSERT PESSARY/OTHER DEVICE: CPT | Mod: S$GLB,,, | Performed by: OBSTETRICS & GYNECOLOGY

## 2023-04-17 PROCEDURE — 99499 NO LOS: ICD-10-PCS | Mod: S$GLB,,, | Performed by: OBSTETRICS & GYNECOLOGY

## 2023-04-17 PROCEDURE — 57160 PR FIT/INSERT INTRAVAG SUPPORT DEVICE: ICD-10-PCS | Mod: S$GLB,,, | Performed by: OBSTETRICS & GYNECOLOGY

## 2023-04-17 NOTE — PROGRESS NOTES
Davida Chery is a 82 y.o.  who presents today for GYN problem visit.     C/C:  Wants pessary placed  Chief Complaint   Patient presents with    Pessary Check       HPI: Has GYN related concerns including patient is referred to me by Karla Bowie NP for placement of pessary.  We did not have the correct size last time she was here so she returns today to get a small size to pessary ring pessary placed.     MENSTRUAL HISTORY  No LMP recorded. Patient has had a hysterectomy..      PAP HISTORY: last pap normal,  denies any history of abnormal pap smear        Review of patient's allergies indicates:   Allergen Reactions    Aspirin      Other reaction(s): Ulcers present    Penicillin      Other reaction(s): Edema, Rash    Meperidine      Other reaction(s): Rash, Swelling    Codeine      [id number]      Diphenhydramine      Other reaction(s): Swelling  swelling of face and mouth      Doxylamin-pse-dm-acetaminophen      Other reaction(s): Sweats    Nyquil liquicaps      Past Medical History:   Diagnosis Date    Anemia     Congestive heart failure     Diabetes mellitus     Heart disease     Lipidemia     Thyroid disease      Past Surgical History:   Procedure Laterality Date    ANKLE SURGERY Right     ANKLE SURGERY Left     BACK SURGERY      x3    COLON SURGERY      twisted    CORONARY STENT PLACEMENT      CYSTOSCOPY W/ RETROGRADES N/A 2022    Procedure: CYSTOSCOPY, WITH RETROGRADE PYELOGRAM;  Surgeon: Sana Pastor MD;  Location: [facility];  Service: Urology;  Laterality: N/A;    GASTRIC BYPASS      HYSTERECTOMY      LASIK Bilateral     NECK SURGERY      x2    OOPHORECTOMY Bilateral     TUBAL LIGATION      WRIST SURGERY Left     WRIST SURGERY Right      Past Surgical History:   Procedure Laterality Date    ANKLE SURGERY Right     ANKLE SURGERY Left     BACK SURGERY      x3    COLON SURGERY      twisted    CORONARY STENT PLACEMENT      CYSTOSCOPY W/ RETROGRADES N/A 2022    Procedure: CYSTOSCOPY, WITH  "RETROGRADE PYELOGRAM;  Surgeon: Sana Pastor MD;  Location: [facility] OR;  Service: Urology;  Laterality: N/A;    GASTRIC BYPASS      HYSTERECTOMY      LASIK Bilateral     NECK SURGERY      x2    OOPHORECTOMY Bilateral     TUBAL LIGATION      WRIST SURGERY Left     WRIST SURGERY Right      OB History    Para Term  AB Living   3 2 2   1 2   SAB IAB Ectopic Multiple Live Births   1       2      # Outcome Date GA Lbr Jorge/2nd Weight Sex Delivery Anes PTL Lv   3 SAB            2 Term            1 Term              OB History          3    Para   2    Term   2            AB   1    Living   2         SAB   1    IAB        Ectopic        Multiple        Live Births   2               Family History   Problem Relation Age of Onset    Kidney disease Father     Heart disease Mother      Social History     Substance and Sexual Activity   Sexual Activity Not Currently    Partners: Male    Birth control/protection: None       Delmar Zhu MD          ROS:  GENERAL: Denies weight gain or weight loss. Feeling well overall.   SKIN: Denies rash or lesions.   HEAD: Denies head injury or headache.   NODES: Denies enlarged lymph nodes.   CHEST: Denies chest pain or shortness of breath.    ABDOMEN: No abdominal pain, constipation, diarrhea, nausea, vomiting or rectal bleeding.   URINARY: No frequency, dysuria, hematuria, or burning on urination.  REPRODUCTIVE: See HPI.   BREASTS: denies pain, lumps, or nipple discharge.   HEMATOLOGIC: No easy bruisability or excessive bleeding.   MUSCULOSKELETAL: Denies joint pain or swelling.   NEUROLOGIC: Denies syncope or weakness.   PSYCHIATRIC: Denies depression, anxiety or mood swings.      OBJECTIVE:  /62 (BP Location: Left arm, Patient Position: Sitting)   Ht 4' 11" (1.499 m)   BMI 24.24 kg/m²   PHYSICAL EXAM:  APPEARANCE: Well nourished, well developed, in no acute distress.  AFFECT: WNL, alert and oriented x 3  SKIN: No acne or hirsutism  CHEST: Good " respiratory effect  ABDOMEN: Soft.  No tenderness or masses.  No hepatosplenomegaly.  No hernias.  BREASTS:  Deferred  PELVIC:  External vaginal area appears normal with no infections.  Internal vaginal area has second-degree cystocele and urethrocele.  Size 2 ring pessary was placed and it appears to be the correct size and in good position after placement.    EXTREMITIES: No edema.      A:  Cystocele, urethrocele.  Complains of discomfort with pressure in the pelvic area due to cystocele and urethrocele.  82 y.o. female  for GYN problem visit  Body mass index is 24.24 kg/m².  Patient Active Problem List   Diagnosis    Hematuria    Mass of urethra    Aorto-iliac atherosclerosis    Bilateral carotid artery disease    Cervical radiculopathy    Lumbosacral radiculopathy    Combined hyperlipidemia    Constipation due to opioid therapy    Diabetic neuropathy with neurologic complication    DM (diabetes mellitus) with complications    Gastritis    GERD (gastroesophageal reflux disease)    H/O gastric bypass    History of Lito-en-Y gastric bypass    Hypomagnesemia    Hypotension    Left shoulder pain    Low back pain    Chronic abdominal pain    Chronic pain syndrome    Fibromyalgia    Generalized abdominal pain    Osteoporosis, postmenopausal    Pain management    Peripheral neuropathy    Pernicious anemia    Recurrent falls    Risk for falls    Vitamin D deficiency disease    Yes    Urethrocele    Rectocele    Gastro-esophageal reflux disease with esophagitis         P:  There are no diagnoses linked to this encounter.  ----Continue annual exams, return then or sooner prn      No follow-ups on file.

## 2023-04-28 ENCOUNTER — TELEPHONE (OUTPATIENT)
Dept: OBSTETRICS AND GYNECOLOGY | Facility: CLINIC | Age: 83
End: 2023-04-28
Payer: MEDICARE

## 2023-04-28 NOTE — TELEPHONE ENCOUNTER
----- Message from Emy Nicholas sent at 4/27/2023  3:51 PM CDT -----  Contact: pt  Type: Needs Medical Advice         Who Called: pt  Best Call Back Number:[phone]  Additional Information: Requesting a call back regarding  pt said that she had a pessary in and pt said she now has a infection and yellow discharge with a smell. Pt is needing the office to call her to tell her what needs to be done.   Please Advise- Thank you

## 2023-05-15 ENCOUNTER — OFFICE VISIT (OUTPATIENT)
Dept: OBSTETRICS AND GYNECOLOGY | Facility: CLINIC | Age: 83
End: 2023-05-15
Payer: MEDICARE

## 2023-05-15 VITALS
HEART RATE: 73 BPM | DIASTOLIC BLOOD PRESSURE: 63 MMHG | WEIGHT: 109.38 LBS | SYSTOLIC BLOOD PRESSURE: 104 MMHG | HEIGHT: 59 IN | BODY MASS INDEX: 22.05 KG/M2

## 2023-05-15 DIAGNOSIS — N81.4 CYSTOCELE WITH PROLAPSE: Primary | ICD-10-CM

## 2023-05-15 DIAGNOSIS — Z46.89 PESSARY MAINTENANCE: ICD-10-CM

## 2023-05-15 DIAGNOSIS — B37.31 YEAST VAGINITIS: ICD-10-CM

## 2023-05-15 DIAGNOSIS — N36.8 MASS OF URETHRA: ICD-10-CM

## 2023-05-15 PROCEDURE — 99213 OFFICE O/P EST LOW 20 MIN: CPT | Mod: S$GLB,,, | Performed by: OBSTETRICS & GYNECOLOGY

## 2023-05-15 PROCEDURE — 99213 PR OFFICE/OUTPT VISIT, EST, LEVL III, 20-29 MIN: ICD-10-PCS | Mod: S$GLB,,, | Performed by: OBSTETRICS & GYNECOLOGY

## 2023-05-15 RX ORDER — DOXYCYCLINE 100 MG/1
100 CAPSULE ORAL 2 TIMES DAILY
COMMUNITY
Start: 2023-05-09

## 2023-05-15 RX ORDER — CEPHALEXIN 500 MG/1
500 CAPSULE ORAL EVERY 8 HOURS
COMMUNITY
Start: 2023-05-08

## 2023-05-15 RX ORDER — FLUCONAZOLE 100 MG/1
100 TABLET ORAL DAILY
Qty: 20 TABLET | Refills: 3 | Status: SHIPPED | OUTPATIENT
Start: 2023-05-15 | End: 2023-08-03

## 2023-05-15 NOTE — PROGRESS NOTES
Davida Chery is a 83 y.o.  who presents today for GYN problem visit.     C/C:   Chief Complaint   Patient presents with    Pessary Check       HPI: Has GYN related concerns including Pessary Check.  Returns for a pessary check today.  Patient states that she feels like the urethra and bladder are staying up like it is supposed to be.  She does say she has a small discharge and on exam today it looks like a yeast infection which she states she gets every few months.  She also has had recent antibiotics for a boil on her low back area that they lanced in the emergency room a week ago.  We will treat her with prolonged course of Diflucan for a couple of weeks and then give her refills on that Diflucan.    MENSTRUAL HISTORY  No LMP recorded. Patient has had a hysterectomy..      PAP HISTORY: last pap hysterectomy ,  denies any history of abnormal pap smear        Review of patient's allergies indicates:   Allergen Reactions    Aspirin      Other reaction(s): Ulcers present    Penicillin      Other reaction(s): Edema, Rash    Meperidine      Other reaction(s): Rash, Swelling    Codeine      [id number]      Diphenhydramine      Other reaction(s): Swelling  swelling of face and mouth      Doxylamin-pse-dm-acetaminophen      Other reaction(s): Sweats    Nyquil liquicaps      Past Medical History:   Diagnosis Date    Anemia     Congestive heart failure     Diabetes mellitus     Heart disease     Lipidemia     Thyroid disease      Past Surgical History:   Procedure Laterality Date    ANKLE SURGERY Right     ANKLE SURGERY Left     BACK SURGERY      x3    COLON SURGERY      twisted    CORONARY STENT PLACEMENT      CYSTOSCOPY W/ RETROGRADES N/A 2022    Procedure: CYSTOSCOPY, WITH RETROGRADE PYELOGRAM;  Surgeon: aSna Pastor MD;  Location: [facility] OR;  Service: Urology;  Laterality: N/A;    GASTRIC BYPASS      HYSTERECTOMY      LASIK Bilateral     NECK SURGERY      x2    OOPHORECTOMY Bilateral     TUBAL LIGATION       WRIST SURGERY Left     WRIST SURGERY Right      Past Surgical History:   Procedure Laterality Date    ANKLE SURGERY Right     ANKLE SURGERY Left     BACK SURGERY      x3    COLON SURGERY      twisted    CORONARY STENT PLACEMENT      CYSTOSCOPY W/ RETROGRADES N/A 2022    Procedure: CYSTOSCOPY, WITH RETROGRADE PYELOGRAM;  Surgeon: Sana Pastor MD;  Location: [facility] OR;  Service: Urology;  Laterality: N/A;    GASTRIC BYPASS      HYSTERECTOMY      LASIK Bilateral     NECK SURGERY      x2    OOPHORECTOMY Bilateral     TUBAL LIGATION      WRIST SURGERY Left     WRIST SURGERY Right      OB History    Para Term  AB Living   3 2 2   1 2   SAB IAB Ectopic Multiple Live Births   1       2      # Outcome Date GA Lbr Jorge/2nd Weight Sex Delivery Anes PTL Lv   3 SAB            2 Term            1 Term              OB History          3    Para   2    Term   2            AB   1    Living   2         SAB   1    IAB        Ectopic        Multiple        Live Births   2               Family History   Problem Relation Age of Onset    Kidney disease Father     Heart disease Mother      Social History     Substance and Sexual Activity   Sexual Activity Not Currently    Partners: Male    Birth control/protection: None       Delmar Zhu MD          ROS:  GENERAL: Denies weight gain or weight loss. Feeling well overall.   SKIN: Denies rash or lesions.   HEAD: Denies head injury or headache.   NODES: Denies enlarged lymph nodes.   CHEST: Denies chest pain or shortness of breath.    ABDOMEN: No abdominal pain, constipation, diarrhea, nausea, vomiting or rectal bleeding.   URINARY: No frequency, dysuria, hematuria, or burning on urination.  REPRODUCTIVE: See HPI.   BREASTS: denies pain, lumps, or nipple discharge.   HEMATOLOGIC: No easy bruisability or excessive bleeding.   MUSCULOSKELETAL: Denies joint pain or swelling.   NEUROLOGIC: Denies syncope or weakness.   PSYCHIATRIC: Denies depression,  "anxiety or mood swings.      OBJECTIVE:  /63   Pulse 73   Ht 4' 11" (1.499 m)   Wt 49.6 kg (109 lb 6.4 oz)   BMI 22.10 kg/m²   PHYSICAL EXAM:  APPEARANCE: Well nourished, well developed, in no acute distress.  AFFECT: WNL, alert and oriented x 3  SKIN: No acne or hirsutism  CHEST: Good respiratory effect  ABDOMEN: Soft.  No tenderness or masses.  No hepatosplenomegaly.  No hernias.  BREASTS:   PELVIC:     EXTREMITIES: No edema.      A:    83 y.o. female  for GYN problem visit  Body mass index is 22.1 kg/m².  Patient Active Problem List   Diagnosis    Hematuria    Mass of urethra    Aorto-iliac atherosclerosis    Bilateral carotid artery disease    Cervical radiculopathy    Lumbosacral radiculopathy    Combined hyperlipidemia    Constipation due to opioid therapy    Diabetic neuropathy with neurologic complication    DM (diabetes mellitus) with complications    Gastritis    GERD (gastroesophageal reflux disease)    H/O gastric bypass    History of Lito-en-Y gastric bypass    Hypomagnesemia    Hypotension    Left shoulder pain    Low back pain    Chronic abdominal pain    Chronic pain syndrome    Fibromyalgia    Generalized abdominal pain    Osteoporosis, postmenopausal    Pain management    Peripheral neuropathy    Pernicious anemia    Recurrent falls    Risk for falls    Vitamin D deficiency disease    Cystocele with prolapse    Urethrocele    Rectocele    Gastro-esophageal reflux disease with esophagitis    Yeast vaginitis         P:  Cystocele with prolapse    Yeast vaginitis    Mass of urethra    Pessary maintenance    Other orders  -     fluconazole (DIFLUCAN) 100 MG tablet; Take 1 tablet (100 mg total) by mouth once daily.  Dispense: 20 tablet; Refill: 3      ----Continue annual exams, return then or sooner prn      Follow up in about 6 months (around 11/15/2023) for Pessary check.  CC: Well woman exam    Davida Chery is a 83 y.o. female  presents for well woman exam.  LMP: No LMP " "recorded. Patient has had a hysterectomy..   Patients last pap was hysterectomy.  Last wellness labs were done 5/3/2022.  Last mammogram was at approximately age 71.   Primary care is .  SBE sometimes  Birth control none.  No issues, problems, or complaints.      Chief Complaint   Patient presents with    Pessary Check        Past Medical History:   Diagnosis Date    Anemia     Congestive heart failure     Diabetes mellitus     Heart disease     Lipidemia     Thyroid disease      Past Surgical History:   Procedure Laterality Date    ANKLE SURGERY Right     ANKLE SURGERY Left     BACK SURGERY      x3    COLON SURGERY      twisted    CORONARY STENT PLACEMENT      CYSTOSCOPY W/ RETROGRADES N/A 2022    Procedure: CYSTOSCOPY, WITH RETROGRADE PYELOGRAM;  Surgeon: Sana Pastor MD;  Location: [facility] OR;  Service: Urology;  Laterality: N/A;    GASTRIC BYPASS      HYSTERECTOMY      LASIK Bilateral     NECK SURGERY      x2    OOPHORECTOMY Bilateral     TUBAL LIGATION      WRIST SURGERY Left     WRIST SURGERY Right      Social History     Socioeconomic History    Marital status:    Tobacco Use    Smoking status: Never     Passive exposure: Never    Smokeless tobacco: Never   Substance and Sexual Activity    Alcohol use: Not Currently    Drug use: Never    Sexual activity: Not Currently     Partners: Male     Birth control/protection: None     Family History   Problem Relation Age of Onset    Kidney disease Father     Heart disease Mother      OB History          3    Para   2    Term   2            AB   1    Living   2         SAB   1    IAB        Ectopic        Multiple        Live Births   2                 /63   Pulse 73   Ht 4' 11" (1.499 m)   Wt 49.6 kg (109 lb 6.4 oz)   BMI 22.10 kg/m²       ROS:  GENERAL: Denies weight gain or weight loss. Feeling well overall.   SKIN: Denies rash or lesions.   HEAD: Denies head injury or headache.   NODES: Denies enlarged lymph nodes. "   CHEST: Denies chest pain or shortness of breath.   CARDIOVASCULAR: Denies palpitations or left sided chest pain.   ABDOMEN: No abdominal pain, constipation, diarrhea, nausea, vomiting or rectal bleeding.   URINARY: No frequency, dysuria, hematuria, or burning on urination.  REPRODUCTIVE: See HPI.   BREASTS: The patient performs breast self-examination and denies pain, lumps, or nipple discharge.   HEMATOLOGIC: No easy bruisability or excessive bleeding.   MUSCULOSKELETAL: Denies joint pain or swelling.   NEUROLOGIC: Denies syncope or weakness.   PSYCHIATRIC: Denies depression, anxiety or mood swings.    PHYSICAL EXAM:  APPEARANCE: Well nourished, well developed, in no acute distress.  AFFECT: WNL, alert and oriented x 3  SKIN: No acne or hirsutism  NECK: Neck symmetric without masses or thyromegaly  NODES: No inguinal, cervical, or axillary lymph node enlargement  CHEST: Good respiratory effect  ABDOMEN: Soft.  No tenderness or masses.  No hernias.  BREASTS: Symmetrical, no skin changes or visible lesions.  No palpable masses, nipple discharge bilaterally.  PELVIC: Normal external genitalia without lesions.  Normal urethral meatus.  Urethral meatus is in a better position and not prolapsed as much as the last time before we put pessary in.  No urethral irritation now.  Pessary was checked and it is in good position and not causing any vaginal erosion or irritation or bleeding.  There is a yeast discharge that is mostly on the outside of the vaginal area and causing some irritation.    EXTREMITIES: No edema.    Cystocele with prolapse    Yeast vaginitis    Mass of urethra    Pessary maintenance    Other orders  -     fluconazole (DIFLUCAN) 100 MG tablet; Take 1 tablet (100 mg total) by mouth once daily.  Dispense: 20 tablet; Refill: 3            Patient was counseled today on A.C.S. Pap guidelines and recommendations for yearly pelvic exams, mammograms and monthly self breast exams; to see her PCP for other health  maintenance.     Follow up in about 6 months (around 11/15/2023) for Pessary check.

## 2023-08-09 ENCOUNTER — OFFICE VISIT (OUTPATIENT)
Dept: OBSTETRICS AND GYNECOLOGY | Facility: CLINIC | Age: 83
End: 2023-08-09
Payer: MEDICARE

## 2023-08-09 VITALS
SYSTOLIC BLOOD PRESSURE: 119 MMHG | WEIGHT: 100.63 LBS | DIASTOLIC BLOOD PRESSURE: 65 MMHG | HEIGHT: 59 IN | HEART RATE: 77 BPM | BODY MASS INDEX: 20.28 KG/M2

## 2023-08-09 DIAGNOSIS — N81.0 URETHROCELE: ICD-10-CM

## 2023-08-09 DIAGNOSIS — B37.31 YEAST VAGINITIS: ICD-10-CM

## 2023-08-09 DIAGNOSIS — N81.4 CYSTOCELE WITH PROLAPSE: Primary | ICD-10-CM

## 2023-08-09 PROBLEM — Z46.89 PESSARY MAINTENANCE: Status: ACTIVE | Noted: 2023-03-28

## 2023-08-09 PROCEDURE — 99213 OFFICE O/P EST LOW 20 MIN: CPT | Mod: S$GLB,,, | Performed by: OBSTETRICS & GYNECOLOGY

## 2023-08-09 PROCEDURE — 99213 PR OFFICE/OUTPT VISIT, EST, LEVL III, 20-29 MIN: ICD-10-PCS | Mod: S$GLB,,, | Performed by: OBSTETRICS & GYNECOLOGY

## 2023-08-09 RX ORDER — FLUCONAZOLE 100 MG/1
100 TABLET ORAL DAILY
Qty: 30 TABLET | Refills: 5 | Status: SHIPPED | OUTPATIENT
Start: 2023-08-09 | End: 2023-08-12

## 2023-08-09 NOTE — PROGRESS NOTES
Davida Chery is a 83 y.o.  who presents today for GYN problem visit.     C/C:   Chief Complaint   Patient presents with    Pessary Check       HPI: Has GYN related concerns including pessary check.  Patient states that she is having significant vaginal discharge and it appears today that she does have a yeast infection that is persistent so we discussed that she needs to have the pessary removed and hopefully that will resolve the yeast infection.  We will refill her Diflucan and she needs to continue to take that for at least 3 weeks after removal of this pessary.  She also needs to see her GI doctor, Dr. Smalls, for her bowel complaints.    MENSTRUAL HISTORY  No LMP recorded. Patient has had a hysterectomy..      PAP HISTORY: last pap hysterectomy,  denies any history of abnormal pap smear        Review of patient's allergies indicates:   Allergen Reactions    Aspirin      Other reaction(s): Ulcers present    Penicillin      Other reaction(s): Edema, Rash    Meperidine      Other reaction(s): Rash, Swelling    Codeine      [id number]      Diphenhydramine      Other reaction(s): Swelling  swelling of face and mouth      Doxylamin-pse-dm-acetaminophen      Other reaction(s): Sweats    Nyquil liquicaps      Past Medical History:   Diagnosis Date    Anemia     Congestive heart failure     Diabetes mellitus     Heart disease     Lipidemia     Thyroid disease      Past Surgical History:   Procedure Laterality Date    ANKLE SURGERY Right     ANKLE SURGERY Left     BACK SURGERY      x3    COLON SURGERY      twisted    CORONARY STENT PLACEMENT      CYSTOSCOPY W/ RETROGRADES N/A 2022    Procedure: CYSTOSCOPY, WITH RETROGRADE PYELOGRAM;  Surgeon: Sana Pastor MD;  Location: [facility] OR;  Service: Urology;  Laterality: N/A;    GASTRIC BYPASS      HYSTERECTOMY      LASIK Bilateral     NECK SURGERY      x2    OOPHORECTOMY Bilateral     TUBAL LIGATION      WRIST SURGERY Left     WRIST SURGERY Right      Past  Surgical History:   Procedure Laterality Date    ANKLE SURGERY Right     ANKLE SURGERY Left     BACK SURGERY      x3    COLON SURGERY      twisted    CORONARY STENT PLACEMENT      CYSTOSCOPY W/ RETROGRADES N/A 2022    Procedure: CYSTOSCOPY, WITH RETROGRADE PYELOGRAM;  Surgeon: Sana Pastor MD;  Location: [facility];  Service: Urology;  Laterality: N/A;    GASTRIC BYPASS      HYSTERECTOMY      LASIK Bilateral     NECK SURGERY      x2    OOPHORECTOMY Bilateral     TUBAL LIGATION      WRIST SURGERY Left     WRIST SURGERY Right      OB History    Para Term  AB Living   3 2 2   1 2   SAB IAB Ectopic Multiple Live Births   1       2      # Outcome Date GA Lbr Jorge/2nd Weight Sex Delivery Anes PTL Lv   3 SAB            2 Term            1 Term              OB History          3    Para   2    Term   2            AB   1    Living   2         SAB   1    IAB        Ectopic        Multiple        Live Births   2               Family History   Problem Relation Age of Onset    Kidney disease Father     Heart disease Mother      Social History     Substance and Sexual Activity   Sexual Activity Not Currently    Partners: Male    Birth control/protection: None       Delmar Zhu MD          ROS:  GENERAL: Denies weight gain or weight loss. Feeling well overall.   SKIN: Denies rash or lesions.   HEAD: Denies head injury or headache.   NODES: Denies enlarged lymph nodes.   CHEST: Denies chest pain or shortness of breath.    ABDOMEN: No abdominal pain, constipation, diarrhea, nausea, vomiting or rectal bleeding.   URINARY: No frequency, dysuria, hematuria, or burning on urination.  REPRODUCTIVE: See HPI.   BREASTS: denies pain, lumps, or nipple discharge.   HEMATOLOGIC: No easy bruisability or excessive bleeding.   MUSCULOSKELETAL: Denies joint pain or swelling.   NEUROLOGIC: Denies syncope or weakness.   PSYCHIATRIC: Denies depression, anxiety or mood swings.      OBJECTIVE:  /65    "Pulse 77   Ht 4' 11" (1.499 m)   Wt 45.6 kg (100 lb 9.6 oz)   BMI 20.32 kg/m²   PHYSICAL EXAM:  APPEARANCE: Well nourished, well developed, in no acute distress.  AFFECT: WNL, alert and oriented x 3  SKIN: No acne or hirsutism  CHEST: Good respiratory effect  ABDOMEN: Soft.  No tenderness or masses.  No hepatosplenomegaly.  No hernias.  BREASTS:  Deferred  PELVIC:  External vaginal area normal.  Pessary in normal position and is removed because patient does appear to have a yeast infection.  It was uncertain that patient really needed a pessary in the 1st place so we will attempt to leave the pessary out and treat her yeast infection and she may not need the pessary replaced.    EXTREMITIES: No edema.      A:    83 y.o. female  for GYN problem visit  Body mass index is 20.32 kg/m².  Patient Active Problem List   Diagnosis    Hematuria    Mass of urethra    Aorto-iliac atherosclerosis    Bilateral carotid artery disease    Cervical radiculopathy    Lumbosacral radiculopathy    Combined hyperlipidemia    Constipation due to opioid therapy    Diabetic neuropathy with neurologic complication    DM (diabetes mellitus) with complications    Gastritis    GERD (gastroesophageal reflux disease)    H/O gastric bypass    History of Lito-en-Y gastric bypass    Hypomagnesemia    Hypotension    Left shoulder pain    Low back pain    Chronic abdominal pain    Chronic pain syndrome    Fibromyalgia    Generalized abdominal pain    Osteoporosis, postmenopausal    Pessary maintenance    Peripheral neuropathy    Pernicious anemia    Recurrent falls    Risk for falls    Vitamin D deficiency disease    Cystocele with prolapse    Urethrocele    Rectocele    Gastro-esophageal reflux disease with esophagitis    Yeast vaginitis         P:  Cystocele with prolapse    Urethrocele    Yeast vaginitis  -     fluconazole (DIFLUCAN) 100 MG tablet; Take 1 tablet (100 mg total) by mouth once daily. for 3 days  Dispense: 30 tablet; Refill: " 5      ----Continue annual exams, return then or sooner prn      No follow-ups on file.  Davida Chery is a 83 y.o.  who presents today for GYN problem visit.     C/C:   Chief Complaint   Patient presents with    Pessary Check       HPI: Has GYN related concerns including patient has vaginal discharge which appears to be a yeast infection and may be affected by pessary use.     MENSTRUAL HISTORY  No LMP recorded. Patient has had a hysterectomy..      PAP HISTORY: last pap hysterectomy and BSO,  denies any history of abnormal pap smear        Review of patient's allergies indicates:   Allergen Reactions    Aspirin      Other reaction(s): Ulcers present    Penicillin      Other reaction(s): Edema, Rash    Meperidine      Other reaction(s): Rash, Swelling    Codeine      [id number]      Diphenhydramine      Other reaction(s): Swelling  swelling of face and mouth      Doxylamin-pse-dm-acetaminophen      Other reaction(s): Sweats    Nyquil liquicaps      Past Medical History:   Diagnosis Date    Anemia     Congestive heart failure     Diabetes mellitus     Heart disease     Lipidemia     Thyroid disease      Past Surgical History:   Procedure Laterality Date    ANKLE SURGERY Right     ANKLE SURGERY Left     BACK SURGERY      x3    COLON SURGERY      twisted    CORONARY STENT PLACEMENT      CYSTOSCOPY W/ RETROGRADES N/A 2022    Procedure: CYSTOSCOPY, WITH RETROGRADE PYELOGRAM;  Surgeon: Sana Pastor MD;  Location: [facility] OR;  Service: Urology;  Laterality: N/A;    GASTRIC BYPASS      HYSTERECTOMY      LASIK Bilateral     NECK SURGERY      x2    OOPHORECTOMY Bilateral     TUBAL LIGATION      WRIST SURGERY Left     WRIST SURGERY Right      Past Surgical History:   Procedure Laterality Date    ANKLE SURGERY Right     ANKLE SURGERY Left     BACK SURGERY      x3    COLON SURGERY      twisted    CORONARY STENT PLACEMENT      CYSTOSCOPY W/ RETROGRADES N/A 2022    Procedure: CYSTOSCOPY, WITH RETROGRADE  "PYELOGRAM;  Surgeon: Sana Pastor MD;  Location: [facility] OR;  Service: Urology;  Laterality: N/A;    GASTRIC BYPASS      HYSTERECTOMY      LASIK Bilateral     NECK SURGERY      x2    OOPHORECTOMY Bilateral     TUBAL LIGATION      WRIST SURGERY Left     WRIST SURGERY Right      OB History    Para Term  AB Living   3 2 2   1 2   SAB IAB Ectopic Multiple Live Births   1       2      # Outcome Date GA Lbr Jorge/2nd Weight Sex Delivery Anes PTL Lv   3 SAB            2 Term            1 Term              OB History          3    Para   2    Term   2            AB   1    Living   2         SAB   1    IAB        Ectopic        Multiple        Live Births   2               Family History   Problem Relation Age of Onset    Kidney disease Father     Heart disease Mother      Social History     Substance and Sexual Activity   Sexual Activity Not Currently    Partners: Male    Birth control/protection: None       Delmar hZu MD          ROS  Review of Systems    OBJECTIVE:  /65   Pulse 77   Ht 4' 11" (1.499 m)   Wt 45.6 kg (100 lb 9.6 oz)   BMI 20.32 kg/m²   Physical Exam       A:    83 y.o. female  for GYN problem visit  Body mass index is 20.32 kg/m².  Patient Active Problem List   Diagnosis    Hematuria    Mass of urethra    Aorto-iliac atherosclerosis    Bilateral carotid artery disease    Cervical radiculopathy    Lumbosacral radiculopathy    Combined hyperlipidemia    Constipation due to opioid therapy    Diabetic neuropathy with neurologic complication    DM (diabetes mellitus) with complications    Gastritis    GERD (gastroesophageal reflux disease)    H/O gastric bypass    History of Lito-en-Y gastric bypass    Hypomagnesemia    Hypotension    Left shoulder pain    Low back pain    Chronic abdominal pain    Chronic pain syndrome    Fibromyalgia    Generalized abdominal pain    Osteoporosis, postmenopausal    Pessary maintenance    Peripheral neuropathy    Pernicious " anemia    Recurrent falls    Risk for falls    Vitamin D deficiency disease    Cystocele with prolapse    Urethrocele    Rectocele    Gastro-esophageal reflux disease with esophagitis    Yeast vaginitis         P:  Cystocele with prolapse    Urethrocele    Yeast vaginitis  -     fluconazole (DIFLUCAN) 100 MG tablet; Take 1 tablet (100 mg total) by mouth once daily. for 3 days  Dispense: 30 tablet; Refill: 5      ----Continue annual exams, return then or sooner prn      No follow-ups on file.

## (undated) DEVICE — UNDERGLOVES BIOGEL PI SZ 7 LF

## (undated) DEVICE — GLOVE SURGEONS ULTRA TOUCH 6.5

## (undated) DEVICE — SOL 9P NACL IRR PIC IL

## (undated) DEVICE — CONTAINER SPECIMEN STRL 4OZ

## (undated) DEVICE — SEE MEDLINE ITEM 157185

## (undated) DEVICE — SEE MEDLINE ITEM 157116

## (undated) DEVICE — SYR 30CC LUER LOCK

## (undated) DEVICE — TOWEL OR DISP STRL BLUE 4/PK

## (undated) DEVICE — GLOVE SURG ULTRA TOUCH 7

## (undated) DEVICE — GAUZE SPONGE 4X4 12PLY

## (undated) DEVICE — DRESSING MEPILEX SACR 16X20CM

## (undated) DEVICE — SOL IRR NACL .9% 3000ML

## (undated) DEVICE — SCRUB HIBICLENS 4% CHG 4OZ

## (undated) DEVICE — STRAP OR TABLE 5IN X 72IN

## (undated) DEVICE — SET CYSTO IRRIGATION UNIV SPIK

## (undated) DEVICE — SPONGE LAP 18X18 PREWASHED

## (undated) DEVICE — CATH URET OPEN END 4.8X8F 70CM

## (undated) DEVICE — SEE MEDLINE ITEM 157181